# Patient Record
Sex: MALE | Race: WHITE
[De-identification: names, ages, dates, MRNs, and addresses within clinical notes are randomized per-mention and may not be internally consistent; named-entity substitution may affect disease eponyms.]

---

## 2023-07-24 ENCOUNTER — HOSPITAL ENCOUNTER (EMERGENCY)
Dept: HOSPITAL 47 - EC | Age: 58
LOS: 1 days | Discharge: HOME | End: 2023-07-25
Payer: COMMERCIAL

## 2023-07-24 VITALS
DIASTOLIC BLOOD PRESSURE: 83 MMHG | TEMPERATURE: 98 F | HEART RATE: 127 BPM | RESPIRATION RATE: 20 BRPM | SYSTOLIC BLOOD PRESSURE: 111 MMHG

## 2023-07-24 DIAGNOSIS — Z91.030: ICD-10-CM

## 2023-07-24 DIAGNOSIS — I10: ICD-10-CM

## 2023-07-24 DIAGNOSIS — R04.0: Primary | ICD-10-CM

## 2023-07-24 DIAGNOSIS — Z88.5: ICD-10-CM

## 2023-07-24 PROCEDURE — 99282 EMERGENCY DEPT VISIT SF MDM: CPT

## 2023-07-24 NOTE — ED
General Adult HPI





- General


Chief complaint: ENT


Stated complaint: Sinus drainage


Time Seen by Provider: 07/24/23 23:31


Source: patient, RN notes reviewed, old records reviewed


Mode of arrival: ambulatory


Limitations: no limitations





- History of Present Illness


Initial comments: 





50-year-old male presents for evaluation of nosebleed.  Patient states that he 

had an intermittent nosebleed throughout the day today and had resolved prior to

going to bed but then the patient woke with recurrent bleeding.  This is all 

down the oropharynx.  There is no anterior bleeding.  He has extensive sinus 

history and is followed with ENT.  Currently being treated for sinus infection. 

No anticoagulation.





- Related Data


                                    Allergies











Allergy/AdvReac Type Severity Reaction Status Date / Time


 


bee venom protein (honey bee) Allergy  Anaphylaxis Verified 07/24/23 23:12


 


codeine Allergy  Abdominal Verified 07/24/23 23:12





   Pain  














Review of Systems


ROS Statement: 


Those systems with pertinent positive or pertinent negative responses have been 

documented in the HPI.





ROS Other: All systems not noted in ROS Statement are negative.





Past Medical History


Past Medical History: Hyperlipidemia, Hypertension


History of Any Multi-Drug Resistant Organisms: None Reported


Past Surgical History: No Surgical Hx Reported


Past Psychological History: No Psychological Hx Reported


Smoking Status: Never smoker


Past Alcohol Use History: Occasional


Past Drug Use History: None Reported





General Exam


Limitations: no limitations


General appearance: alert, in no apparent distress


Head exam: Present: atraumatic, normocephalic


ENT exam: Present: normal oropharynx, other (No visualized bleeding or right or 

left air.)


Respiratory exam: Present: normal lung sounds bilaterally.  Absent: respiratory 

distress


Cardiovascular Exam: Present: regular rate, normal rhythm


GI/Abdominal exam: Present: soft.  Absent: distended, tenderness


Extremities exam: Present: normal inspection, normal capillary refill


Neurological exam: Present: alert, oriented X3, CN II-XII intact.  Absent: motor

sensory deficit


Psychiatric exam: Present: normal affect, normal mood


Skin exam: Present: warm, dry, intact





Course


                                   Vital Signs











  07/24/23





  23:10


 


Temperature 98 F


 


Pulse Rate 127 H


 


Respiratory 20





Rate 


 


Blood Pressure 111/83


 


O2 Sat by Pulse 98





Oximetry 














Medical Decision Making





- Medical Decision Making





Was pt. sent in by a medical professional or institution (, PA, NP, urgent 

care, hospital, or nursing home...) When possible be specific


@  -No


Did you speak to anyone other than the patient for history (EMS, parent, family,

police, friend...)? What history was obtained from this source 


@  -No


Did you review nursing and triage notes (agree or disagree)?  Why? 


@  -I reviewed and agree with nursing and triage notes


Were old charts reviewed (outside hosp., previous admission, EMS record, old 

EKG, old radiological studies, urgent care reports/EKG's, nursing home records)?

Report findings 


@  -No old charts were reviewed


Differential Diagnosis (chest pain, altered mental status, abdominal pain women,

abdominal pain men, vaginal bleeding, weakness, fever, dyspnea, syncope, 

headache, dizziness, GI bleed, back pain, seizure, CVA, palpatations, mental 

health, musculoskeletal)? 


@Sinus infection, epistaxis


EKG interpreted by me (3pts min.).


@  -As above


X-rays interpreted by me (1pt min.).


@  -None done


CT interpreted by me (1pt min.).


@  -None done


U/S interpreted by me (1pt. min.).


@  -None done


What testing was considered but not performed or refused? (CT, X-rays, U/S, 

labs)? Why?


@  -None


What meds were considered but not given or refused? Why?


@  -None


Did you discuss the management of the patient with other professionals 

(professionals i.e. , PA, NP, lab, RT, psych nurse, , , 

teacher, , )? Give summary


@  -No


Was smoking cessation discussed for >3mins.?


@  -No


Was critical care preformed (if so, how long)?


@  -No


Were there social determinants of health that impacted care today? How? 

(Homelessness, low income, unemployed, alcoholism, drug addiction, 

transportation, low edu. Level, literacy, decrease access to med. care, alf, 

rehab)?


@  -No


Was there de-escalation of care discussed even if they declined (Discuss DNR or 

withdrawal of care, Hospice)? DNR status


@  -No


What co-morbidities impacted this encounter? (DM, HTN, Smoking, COPD, CAD, 

Cancer, CVA, ARF, Chemo, Hep., AIDS, mental health diagnosis, sleep apnea, 

morbid obesity)?


@  -[Hypertension, prior sinus surgery


Was patient admitted / discharged? Hospital course, mention meds given and 

route, prescriptions, significant lab abnormalities, going to OR and other 

pertinent info.


@  -[50-year-old male with likely posterior nosebleed.  This is not able to be 

visualized.  He has minimal bleeding upon arrival and this does completely 

resolve with ice water and Afrin.  Patient is instructed to use nasal saline.  

He will return as needed to the emergency department.  He will follow-up with 

his ENT surgeon


Undiagnosed new problem with uncertain prognosis?


@  -No


Drug Therapy requiring intensive monitoring for toxicity (Heparin, Nitro, 

Insulin, Cardizem)?


@  -No


Were any procedures done?


@  -No


Diagnosis/symptom?


@Epistaxis, resolved


Acute, or Chronic, or Acute on Chronic?


@Acute


Uncomplicated (without systemic symptoms) or Complicated (systemic symptoms)?


@  -default


Side effects of treatment?


@  -No


Exacerbation, Progression, or Severe Exacerbation?


@  -No


Poses a threat to life or bodily function? How? (Chest pain, USA, MI, pneumonia,

 PE, COPD, DKA, ARF, appy, cholecystitis, CVA, Diverticulitis, Homicidal, 

Suicidal, threat to staff... and all critical care pts)


@  -Low risk at this time





Disposition


Clinical Impression: 


 Epistaxis





Disposition: HOME SELF-CARE


Instructions (If sedation given, give patient instructions):  Nosebleed (ED)


Is patient prescribed a controlled substance at d/c from ED?: No


Referrals: 


Yariel Gilliland MD [Primary Care Provider] - 1-2 days


Jacob Maya DO [Doctor of Osteopathic Medicine] - 1-2 days


Time of Disposition: 00:03

## 2023-08-23 ENCOUNTER — HOSPITAL ENCOUNTER (EMERGENCY)
Dept: HOSPITAL 47 - EC | Age: 58
Discharge: HOME | End: 2023-08-23
Payer: COMMERCIAL

## 2023-08-23 VITALS — TEMPERATURE: 98.4 F | HEART RATE: 97 BPM | SYSTOLIC BLOOD PRESSURE: 118 MMHG | DIASTOLIC BLOOD PRESSURE: 80 MMHG

## 2023-08-23 VITALS — RESPIRATION RATE: 18 BRPM

## 2023-08-23 DIAGNOSIS — R10.31: Primary | ICD-10-CM

## 2023-08-23 DIAGNOSIS — Z91.030: ICD-10-CM

## 2023-08-23 DIAGNOSIS — E78.5: ICD-10-CM

## 2023-08-23 DIAGNOSIS — Z79.899: ICD-10-CM

## 2023-08-23 DIAGNOSIS — Z88.5: ICD-10-CM

## 2023-08-23 DIAGNOSIS — I10: ICD-10-CM

## 2023-08-23 LAB
ALBUMIN SERPL-MCNC: 4.7 G/DL (ref 3.5–5)
ALP SERPL-CCNC: 97 U/L (ref 38–126)
ALT SERPL-CCNC: 45 U/L (ref 4–49)
AMYLASE SERPL-CCNC: 49 U/L (ref 30–110)
ANION GAP SERPL CALC-SCNC: 13 MMOL/L
APTT BLD: 24 SEC (ref 22–30)
AST SERPL-CCNC: 31 U/L (ref 17–59)
BASOPHILS # BLD AUTO: 0 K/UL (ref 0–0.2)
BASOPHILS NFR BLD AUTO: 0 %
BUN SERPL-SCNC: 15 MG/DL (ref 9–20)
CALCIUM SPEC-MCNC: 9.5 MG/DL (ref 8.4–10.2)
CHLORIDE SERPL-SCNC: 97 MMOL/L (ref 98–107)
CO2 SERPL-SCNC: 24 MMOL/L (ref 22–30)
EOSINOPHIL # BLD AUTO: 0.2 K/UL (ref 0–0.7)
EOSINOPHIL NFR BLD AUTO: 1 %
ERYTHROCYTE [DISTWIDTH] IN BLOOD BY AUTOMATED COUNT: 4.34 M/UL (ref 4.3–5.9)
ERYTHROCYTE [DISTWIDTH] IN BLOOD: 12.4 % (ref 11.5–15.5)
GLUCOSE SERPL-MCNC: 143 MG/DL (ref 74–99)
HCT VFR BLD AUTO: 39.9 % (ref 39–53)
HGB BLD-MCNC: 13.2 GM/DL (ref 13–17.5)
INR PPP: 1.1 (ref ?–1.2)
LIPASE SERPL-CCNC: 109 U/L (ref 23–300)
LYMPHOCYTES # SPEC AUTO: 1.7 K/UL (ref 1–4.8)
LYMPHOCYTES NFR SPEC AUTO: 10 %
MCH RBC QN AUTO: 30.4 PG (ref 25–35)
MCHC RBC AUTO-ENTMCNC: 33.1 G/DL (ref 31–37)
MCV RBC AUTO: 91.8 FL (ref 80–100)
MONOCYTES # BLD AUTO: 0.8 K/UL (ref 0–1)
MONOCYTES NFR BLD AUTO: 4 %
NEUTROPHILS # BLD AUTO: 14.6 K/UL (ref 1.3–7.7)
NEUTROPHILS NFR BLD AUTO: 84 %
PH UR: 6 [PH] (ref 5–8)
PLATELET # BLD AUTO: 247 K/UL (ref 150–450)
POTASSIUM SERPL-SCNC: 4.1 MMOL/L (ref 3.5–5.1)
PROT SERPL-MCNC: 8.7 G/DL (ref 6.3–8.2)
PT BLD: 11.1 SEC (ref 9–12)
SODIUM SERPL-SCNC: 134 MMOL/L (ref 137–145)
SP GR UR: >1.05 (ref 1–1.03)
UROBILINOGEN UR QL STRIP: <2 MG/DL (ref ?–2)
WBC # BLD AUTO: 17.5 K/UL (ref 3.8–10.6)

## 2023-08-23 PROCEDURE — 84484 ASSAY OF TROPONIN QUANT: CPT

## 2023-08-23 PROCEDURE — 83690 ASSAY OF LIPASE: CPT

## 2023-08-23 PROCEDURE — 96361 HYDRATE IV INFUSION ADD-ON: CPT

## 2023-08-23 PROCEDURE — 81003 URINALYSIS AUTO W/O SCOPE: CPT

## 2023-08-23 PROCEDURE — 82150 ASSAY OF AMYLASE: CPT

## 2023-08-23 PROCEDURE — 80053 COMPREHEN METABOLIC PANEL: CPT

## 2023-08-23 PROCEDURE — 96375 TX/PRO/DX INJ NEW DRUG ADDON: CPT

## 2023-08-23 PROCEDURE — 99284 EMERGENCY DEPT VISIT MOD MDM: CPT

## 2023-08-23 PROCEDURE — 36415 COLL VENOUS BLD VENIPUNCTURE: CPT

## 2023-08-23 PROCEDURE — 96374 THER/PROPH/DIAG INJ IV PUSH: CPT

## 2023-08-23 PROCEDURE — 74177 CT ABD & PELVIS W/CONTRAST: CPT

## 2023-08-23 PROCEDURE — 85730 THROMBOPLASTIN TIME PARTIAL: CPT

## 2023-08-23 PROCEDURE — 85610 PROTHROMBIN TIME: CPT

## 2023-08-23 PROCEDURE — 85025 COMPLETE CBC W/AUTO DIFF WBC: CPT

## 2023-08-23 PROCEDURE — 93005 ELECTROCARDIOGRAM TRACING: CPT

## 2023-08-23 NOTE — CT
EXAMINATION TYPE: CT abdomen pelvis w con

 

DATE OF EXAM: 8/23/2023

 

COMPARISON: None

 

HISTORY: RLQ pain

 

CT DLP: 2204.5 mGycm

 

CONTRAST: 

CT scan of the abdomen and pelvis is performed without Oral Contrast and with IV Contrast, patient in
jected with 100 mL of Isovue 300.

 

FINDINGS: 

LUNG BASES-: No visible nodule.  No infiltrate. 

 

LIVER/GB:   No calcified gallstones.  No space occupying hepatic lesion. Biliary tree is of normal ca
liber. There is evidence of hepatic steatosis.

 

PANCREAS:  No inflammation.  No distinct mass. 

 

SPLEEN:  No splenic enlargement.  No lesion seen. 

 

ADRENALS:  No nodule.  No thickening. 

 

KIDNEYS/BLADDER:  No hydronephrosis.  No nephrolithiasis. 2 cm cyst right kidney. Urinary bladder sesar
ssly unremarkable. 

 

BOWEL: Normal appendix.  Normal bowel caliber.  No inflammation.

 

GENITAL ORGANS: Prostate gland calcifications present.

 

LYMPH NODES:  No greater than 1cm abdominal or pelvic lymph nodes are appreciated.

 

AORTA: No significant abnormality. 

 

OSSEOUS STRUCTURES:  No significant abnormality is seen. 

 

OTHER:  No significant additional abnormality is seen. 

 

IMPRESSION: 

1. No acute intra-abdominal process seen.

## 2023-08-23 NOTE — ED
General Adult HPI





- General


Stated complaint: abd pain


Time Seen by Provider: 08/23/23 11:20





- History of Present Illness


Initial comments: 


58-year-old male presenting to the ED with a chief complaint abdominal pain.  

Patient states last night started to experience right lower abdominal pain.  

Pain is sharp in nature.  Currently an 8 out of 10 in severity.  Since onset 

patient reports pain has increased in severity.  Associated nausea, no vomiting 

and subjective fever.  Denies changes in bowel or bladder habits.  Denies chest 

pain or shortness of breath.  No other complaints.








- Related Data


                                Home Medications











 Medication  Instructions  Recorded  Confirmed


 


Atorvastatin [Lipitor] 10 mg PO HS 08/23/23 08/23/23


 


Fish Oil/Dha/Epa [Fish Oil 1,200 1 cap PO DAILY 08/23/23 08/23/23





mg Fish Oil]   


 


Glucosamine/Msm 1500/1500mg 1 tab PO DAILY 08/23/23 08/23/23


 


Magnesium Citrate 250 mg PO DAILY 08/23/23 08/23/23


 


Omeprazole 20 mg PO DAILY 08/23/23 08/23/23


 


lisinopriL [Zestril] 5 mg PO HS 08/23/23 08/23/23











                                    Allergies











Allergy/AdvReac Type Severity Reaction Status Date / Time


 


bee venom protein (honey bee) Allergy  Anaphylaxis Verified 08/23/23 12:49


 


codeine AdvReac  Abdominal Verified 08/23/23 12:49





   Pain  














Review of Systems


ROS Statement: 


Those systems with pertinent positive or pertinent negative responses have been 

documented in the HPI.





ROS Other: All systems not noted in ROS Statement are negative.





Past Medical History


Past Medical History: Hyperlipidemia, Hypertension


History of Any Multi-Drug Resistant Organisms: None Reported


Past Surgical History: No Surgical Hx Reported


Past Psychological History: No Psychological Hx Reported


Smoking Status: Never smoker


Past Alcohol Use History: Occasional


Past Drug Use History: None Reported





General Exam


Limitations: no limitations


General appearance: alert, in no apparent distress


Eye exam: Present: normal appearance


ENT exam: Present: mucous membranes moist


Respiratory exam: Present: normal lung sounds bilaterally


Cardiovascular Exam: Present: regular rate, normal rhythm


GI/Abdominal exam: Present: tenderness (Tenderness to palpation of the right 

lower quadrant with some rigidity.  Positive psoas sign.  No rebound.), normal 

bowel sounds


Neurological exam: Present: alert, oriented X3


Skin exam: Present: warm, dry





Course


                                   Vital Signs











  08/23/23 08/23/23 08/23/23





  11:21 11:44 13:21


 


Temperature 100.1 F H  


 


Pulse Rate 101 H 102 H 90


 


Respiratory 18 18 18





Rate   


 


Blood Pressure 117/78 134/79 110/75


 


O2 Sat by Pulse 96 95 97





Oximetry   














Medical Decision Making





- Medical Decision Making


Was pt. sent in by a medical professional or institution (, PA, NP, urgent 

care, hospital, or nursing home...) When possible be specific


@  -No


Did you speak to anyone other than the patient for history (EMS, parent, family,

 police, friend...)? What history was obtained from this source 


@  -No


Did you review nursing and triage notes (agree or disagree)?  Why? 


@  -I reviewed and agree with nursing and triage notes


Were old charts reviewed (outside hosp., previous admission, EMS record, old 

EKG, old radiological studies, urgent care reports/EKG's, nursing home records)?

 Report findings 


@  -No old charts were reviewed


Differential Diagnosis (chest pain, altered mental status, abdominal pain women,

 abdominal pain men, vaginal bleeding, weakness, fever, dyspnea, syncope, 

headache, dizziness, GI bleed, back pain, seizure, CVA, palpatations, mental 

health, musculoskeletal)? 


@  -Differential Abdominal Pain Men:


Appendicitis, cholecystitis, diverticulosis, ischemic bowel, pancreatitis, 

hepatitis, UTI, gastroenteritis, AAA, incarcerated hernia, bowel obstruction, 

constipation, inflammatory bowel, hepatitis, peptic ulcer disease, splenic 

infarction, perforated viscus, testicular torsion, this is not meant to be an 

all-inclusive list


EKG interpreted by me (3pts min.).


@  -As above


X-rays interpreted by me (1pt min.).


@  -None done


CT interpreted by me (1pt min.).


@  -CT interpreted by me CT of the abdomen and pelvis shows no evidence of acute

 process.


U/S interpreted by me (1pt. min.).


@  -None done


What testing was considered but not performed or refused? (CT, X-rays, U/S, 

labs)? Why?


@  -None


What meds were considered but not given or refused? Why?


@  -None


Did you discuss the management of the patient with other professionals 

(professionals i.e. GURPREET Golden, NP, lab, RT, psych nurse, , , 

teacher, , )? Give summary


@  -No


Was smoking cessation discussed for >3mins.?


@  -No


Was critical care preformed (if so, how long)?


@  -No


Were there social determinants of health that impacted care today? How? 

(Homelessness, low income, unemployed, alcoholism, drug addiction, 

transportation, low edu. Level, literacy, decrease access to med. care, long-term, 

rehab)?


@  -No


Was there de-escalation of care discussed even if they declined (Discuss DNR or 

withdrawal of care, Hospice)? DNR status


@  -No


What co-morbidities impacted this encounter? (DM, HTN, Smoking, COPD, CAD, 

Cancer, CVA, ARF, Chemo, Hep., AIDS, mental health diagnosis, sleep apnea, morbi

d obesity)?


@  -None


Was patient admitted / discharged? Hospital course, mention meds given and 

route, prescriptions, significant lab abnormalities, going to OR and other 

pertinent info.


@  -Discharged.  Laboratory studies did reveal a white count of 17.5 otherwise 

unremarkable.  Imaging showed no acute findings.  Patient had improvement of p

ain and nausea Toradol and Zofran here.  This time no evidence of acute abdomen.

 Patient discharged home in stable condition.  Discussed return precautions with

 patient who verbalizes agreement.


Undiagnosed new problem with uncertain prognosis?


@  -No


Drug Therapy requiring intensive monitoring for toxicity (Heparin, Nitro, 

Insulin, Cardizem)?


@  -No


Were any procedures done?


@  -No


Diagnosis/symptom?


@  -Abdominal pain


Acute, or Chronic, or Acute on Chronic?


@  -Acute


Uncomplicated (without systemic symptoms) or Complicated (systemic symptoms)?


@  -Uncomplicated


Side effects of treatment?


@  -No


Exacerbation, Progression, or Severe Exacerbation?


@  -No


Poses a threat to life or bodily function? How? (Chest pain, USA, MI, pneumonia,

 PE, COPD, DKA, ARF, appy, cholecystitis, CVA, Diverticulitis, Homicidal, 

Suicidal, threat to staff... and all critical care pts)


@  -No








- Lab Data


Result diagrams: 


                                 08/23/23 11:37





                                 08/23/23 11:37


                                   Lab Results











  08/23/23 08/23/23 08/23/23 Range/Units





  11:37 11:37 11:37 


 


WBC  17.5 H    (3.8-10.6)  k/uL


 


RBC  4.34    (4.30-5.90)  m/uL


 


Hgb  13.2    (13.0-17.5)  gm/dL


 


Hct  39.9    (39.0-53.0)  %


 


MCV  91.8    (80.0-100.0)  fL


 


MCH  30.4    (25.0-35.0)  pg


 


MCHC  33.1    (31.0-37.0)  g/dL


 


RDW  12.4    (11.5-15.5)  %


 


Plt Count  247    (150-450)  k/uL


 


MPV  7.8    


 


Neutrophils %  84    %


 


Lymphocytes %  10    %


 


Monocytes %  4    %


 


Eosinophils %  1    %


 


Basophils %  0    %


 


Neutrophils #  14.6 H    (1.3-7.7)  k/uL


 


Lymphocytes #  1.7    (1.0-4.8)  k/uL


 


Monocytes #  0.8    (0-1.0)  k/uL


 


Eosinophils #  0.2    (0-0.7)  k/uL


 


Basophils #  0.0    (0-0.2)  k/uL


 


PT    11.1  (9.0-12.0)  sec


 


INR    1.1  (<1.2)  


 


APTT    24.0  (22.0-30.0)  sec


 


Sodium   134 L   (137-145)  mmol/L


 


Potassium   4.1   (3.5-5.1)  mmol/L


 


Chloride   97 L   ()  mmol/L


 


Carbon Dioxide   24   (22-30)  mmol/L


 


Anion Gap   13   mmol/L


 


BUN   15   (9-20)  mg/dL


 


Creatinine   0.88   (0.66-1.25)  mg/dL


 


Est GFR (CKD-EPI)AfAm   >90   (>60 ml/min/1.73 sqM)  


 


Est GFR (CKD-EPI)NonAf   >90   (>60 ml/min/1.73 sqM)  


 


Glucose   143 H   (74-99)  mg/dL


 


Calcium   9.5   (8.4-10.2)  mg/dL


 


Total Bilirubin   1.4 H   (0.2-1.3)  mg/dL


 


AST   31   (17-59)  U/L


 


ALT   45   (4-49)  U/L


 


Alkaline Phosphatase   97   ()  U/L


 


Troponin I     (0.000-0.034)  ng/mL


 


Total Protein   8.7 H   (6.3-8.2)  g/dL


 


Albumin   4.7   (3.5-5.0)  g/dL


 


Amylase   49   ()  U/L


 


Lipase   109   ()  U/L


 


Urine Color     


 


Urine Appearance     (Clear)  


 


Urine pH     (5.0-8.0)  


 


Ur Specific Gravity     (1.001-1.035)  


 


Urine Protein     (Negative)  


 


Urine Glucose (UA)     (Negative)  


 


Urine Ketones     (Negative)  


 


Urine Blood     (Negative)  


 


Urine Nitrite     (Negative)  


 


Urine Bilirubin     (Negative)  


 


Urine Urobilinogen     (<2.0)  mg/dL


 


Ur Leukocyte Esterase     (Negative)  














  08/23/23 08/23/23 Range/Units





  11:37 11:37 


 


WBC    (3.8-10.6)  k/uL


 


RBC    (4.30-5.90)  m/uL


 


Hgb    (13.0-17.5)  gm/dL


 


Hct    (39.0-53.0)  %


 


MCV    (80.0-100.0)  fL


 


MCH    (25.0-35.0)  pg


 


MCHC    (31.0-37.0)  g/dL


 


RDW    (11.5-15.5)  %


 


Plt Count    (150-450)  k/uL


 


MPV    


 


Neutrophils %    %


 


Lymphocytes %    %


 


Monocytes %    %


 


Eosinophils %    %


 


Basophils %    %


 


Neutrophils #    (1.3-7.7)  k/uL


 


Lymphocytes #    (1.0-4.8)  k/uL


 


Monocytes #    (0-1.0)  k/uL


 


Eosinophils #    (0-0.7)  k/uL


 


Basophils #    (0-0.2)  k/uL


 


PT    (9.0-12.0)  sec


 


INR    (<1.2)  


 


APTT    (22.0-30.0)  sec


 


Sodium    (137-145)  mmol/L


 


Potassium    (3.5-5.1)  mmol/L


 


Chloride    ()  mmol/L


 


Carbon Dioxide    (22-30)  mmol/L


 


Anion Gap    mmol/L


 


BUN    (9-20)  mg/dL


 


Creatinine    (0.66-1.25)  mg/dL


 


Est GFR (CKD-EPI)AfAm    (>60 ml/min/1.73 sqM)  


 


Est GFR (CKD-EPI)NonAf    (>60 ml/min/1.73 sqM)  


 


Glucose    (74-99)  mg/dL


 


Calcium    (8.4-10.2)  mg/dL


 


Total Bilirubin    (0.2-1.3)  mg/dL


 


AST    (17-59)  U/L


 


ALT    (4-49)  U/L


 


Alkaline Phosphatase    ()  U/L


 


Troponin I   <0.012  (0.000-0.034)  ng/mL


 


Total Protein    (6.3-8.2)  g/dL


 


Albumin    (3.5-5.0)  g/dL


 


Amylase    ()  U/L


 


Lipase    ()  U/L


 


Urine Color  Yellow   


 


Urine Appearance  Clear   (Clear)  


 


Urine pH  6.0   (5.0-8.0)  


 


Ur Specific Gravity  >1.050 H   (1.001-1.035)  


 


Urine Protein  Trace H   (Negative)  


 


Urine Glucose (UA)  Negative   (Negative)  


 


Urine Ketones  Negative   (Negative)  


 


Urine Blood  Negative   (Negative)  


 


Urine Nitrite  Negative   (Negative)  


 


Urine Bilirubin  Negative   (Negative)  


 


Urine Urobilinogen  <2.0   (<2.0)  mg/dL


 


Ur Leukocyte Esterase  Negative   (Negative)  














- EKG Data


EKG Comments: 


Shows a sinus rhythm at 101 bpm without acute ST or T-wave changes with frequent

 PVCs.  , , QT/QTc 341/399.








Disposition


Clinical Impression: 


 Abdominal pain





Disposition: HOME SELF-CARE


Condition: Good


Instructions (If sedation given, give patient instructions):  Abdominal Pain 

(ED)


Additional Instructions: 


Please return to the Emergency Department if symptoms worsen or any other 

concerns.


Is patient prescribed a controlled substance at d/c from ED?: No


Referrals: 


Yariel Gilliland MD [Primary Care Provider] - 1-2 days


Time of Disposition: 15:03

## 2024-10-07 ENCOUNTER — HOSPITAL ENCOUNTER (OUTPATIENT)
Dept: HOSPITAL 47 - RADCTMAIN | Age: 59
Discharge: HOME | End: 2024-10-07
Attending: FAMILY MEDICINE
Payer: COMMERCIAL

## 2024-10-07 PROCEDURE — 70491 CT SOFT TISSUE NECK W/DYE: CPT

## 2024-10-07 NOTE — CT
EXAMINATION TYPE: CT soft tissue neck w con

CT DLP: 449 mGycm, Automated exposure control for dose reduction was used.

 

DATE OF EXAM: 10/7/2024 6:12 PM

 

COMPARISON: None

 

CLINICAL INDICATION: Male, 59 years old with history of R59.0 LOCALIZED ENLARGED LYMPH NODES; PHH, En
larged lymph nodes x2 weeks

 

TECHNIQUE: Standard enhanced CT of the neck.  Axial sections with coronal and sagittal reformats were
 obtained. 

Contrast used:100 ml mL of Isovue 370 with IV Contrast, (None if empty)

Oral contrast used: (None if empty)

 

FINDINGS: 

Brain: Visualized portions are grossly unremarkable.

Orbits: Unremarkable 

Sinuses: Grossly unremarkable.

Spaces of the neck: Cystic lesion anterior to the left sternocleidomastoid muscle measuring 34 x 24 x
 48 mm. Additional lymph node posterior neck lymph node chain measuring up to 15 mm x 20 mm. 

 

Soft tissue mass at the level of vocal cords in the left measuring at least 37 by 29 x 15 mm possibly
 extending into the glottis.e. The lymph nodes demonstrate cystic change. The right neck did not demo
nstrate a large lymph node at this time. Correlate with PET/CT.

 

Musculoskeletal: No acute osseous pathology. Moderate degeneration changes throughout the neck verteb
ral bodies.

Lymph nodes: As described above

Vascular structures: Visualized major arteries are patent without evidence of aneurysm.

Thoracic Inlet/airway: Airway is patent. The lung apices are clear.

Soft tissues/Thyroid: Thyroid and remainder of the soft tissues are unremarkable.

Other: none.

 

IMPRESSION

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 Follow-up PET/CTs re
commended and full oncologic workup.

 

[facility], Workstation: [id number], 10/7/2024 7:25 PM

## 2024-11-21 ENCOUNTER — HOSPITAL ENCOUNTER (OUTPATIENT)
Dept: HOSPITAL 47 - RADPETMAIN | Age: 59
Discharge: HOME | End: 2024-11-21
Attending: INTERNAL MEDICINE
Payer: COMMERCIAL

## 2024-11-21 DIAGNOSIS — K14.9: ICD-10-CM

## 2024-11-21 DIAGNOSIS — C32.8: Primary | ICD-10-CM

## 2024-11-21 PROCEDURE — 78815 PET IMAGE W/CT SKULL-THIGH: CPT

## 2025-04-17 ENCOUNTER — HOSPITAL ENCOUNTER (OUTPATIENT)
Dept: HOSPITAL 47 - RADPETMAIN | Age: 60
Discharge: HOME | End: 2025-04-17
Attending: RADIOLOGY
Payer: COMMERCIAL

## 2025-04-17 DIAGNOSIS — C32.1: Primary | ICD-10-CM

## 2025-04-17 DIAGNOSIS — R59.0: ICD-10-CM

## 2025-04-17 DIAGNOSIS — C77.0: ICD-10-CM

## 2025-04-17 PROCEDURE — 78815 PET IMAGE W/CT SKULL-THIGH: CPT

## 2025-07-17 ENCOUNTER — HOSPITAL ENCOUNTER (OUTPATIENT)
Dept: HOSPITAL 47 - RADPETMAIN | Age: 60
Discharge: HOME | End: 2025-07-17
Attending: INTERNAL MEDICINE
Payer: COMMERCIAL

## 2025-07-17 DIAGNOSIS — C32.8: Primary | ICD-10-CM

## 2025-07-17 PROCEDURE — 78815 PET IMAGE W/CT SKULL-THIGH: CPT
